# Patient Record
Sex: FEMALE | Race: WHITE | Employment: UNEMPLOYED | URBAN - METROPOLITAN AREA
[De-identification: names, ages, dates, MRNs, and addresses within clinical notes are randomized per-mention and may not be internally consistent; named-entity substitution may affect disease eponyms.]

---

## 2022-02-10 RX ORDER — SERTRALINE HYDROCHLORIDE 100 MG/1
100 TABLET, FILM COATED ORAL DAILY
COMMUNITY

## 2022-02-10 RX ORDER — ACETAMINOPHEN 160 MG
TABLET,DISINTEGRATING ORAL
COMMUNITY

## 2022-02-10 RX ORDER — PANTOPRAZOLE SODIUM 40 MG/1
40 TABLET, DELAYED RELEASE ORAL DAILY
COMMUNITY

## 2022-02-10 RX ORDER — LOSARTAN POTASSIUM 50 MG/1
50 TABLET ORAL DAILY
COMMUNITY

## 2022-02-10 RX ORDER — M-VIT,TX,IRON,MINS/CALC/FOLIC 27MG-0.4MG
1 TABLET ORAL DAILY
COMMUNITY

## 2022-02-10 NOTE — PROGRESS NOTES
Preoperative Instructions:    Stop eating solid foods at midnight the night prior to your surgery. Stop drinking clear liquids at midnight the night prior to your surgery. Arrive at the surgery center (3rd entrance) on ________2/24_______ by ______800am_________. Please stop any blood thinning medications as directed by your surgeon or prescribing physician. Failure to stop certain medications may interfere with your scheduled surgery. These may include: Aspirin, Coumadin, Plavix, NSAIDS (Motrin, Aleve, Advil, Mobic, Celebrex), Eliquis, Pradaxa, Xarelto, Fish oil, and herbal supplements. You may continue the rest of your medications through the night before surgery unless instructed otherwise. Day of surgery please take only the following medication(s) with a small sip of water:losartan      Please use and bring inhalers the day of surgery. Take a shower or bath on the morning of your surgery/procedure (hibiclens if directed)      Reminders:  -If you are going home the day of your procedure, you will need a family member or friend to stay during the procedure and drive you home after your procedure. Your  must be 25years of age or older and able to sign off on your discharge instructions.    -If you are going home the same day of your surgery, someone must remain with you for the first 24 hours after your surgery if you receive sedation or anesthesia.      -Please do not wear any jewelery or body piercing the day of surgery

## 2022-02-22 ENCOUNTER — ANESTHESIA EVENT (OUTPATIENT)
Dept: OPERATING ROOM | Age: 77
End: 2022-02-22
Payer: MEDICARE

## 2022-02-24 ENCOUNTER — ANESTHESIA (OUTPATIENT)
Dept: OPERATING ROOM | Age: 77
End: 2022-02-24
Payer: MEDICARE

## 2022-02-24 ENCOUNTER — HOSPITAL ENCOUNTER (OUTPATIENT)
Age: 77
Setting detail: OUTPATIENT SURGERY
Discharge: HOME | End: 2022-02-24
Attending: OBSTETRICS & GYNECOLOGY | Admitting: OBSTETRICS & GYNECOLOGY
Payer: MEDICARE

## 2022-02-24 ENCOUNTER — APPOINTMENT (OUTPATIENT)
Dept: GENERAL RADIOLOGY | Age: 77
End: 2022-02-24
Attending: OBSTETRICS & GYNECOLOGY
Payer: MEDICARE

## 2022-02-24 VITALS
DIASTOLIC BLOOD PRESSURE: 63 MMHG | BODY MASS INDEX: 26.5 KG/M2 | SYSTOLIC BLOOD PRESSURE: 144 MMHG | WEIGHT: 144 LBS | RESPIRATION RATE: 17 BRPM | HEART RATE: 75 BPM | OXYGEN SATURATION: 98 % | HEIGHT: 62 IN | TEMPERATURE: 97.7 F

## 2022-02-24 VITALS
RESPIRATION RATE: 14 BRPM | OXYGEN SATURATION: 100 % | SYSTOLIC BLOOD PRESSURE: 157 MMHG | DIASTOLIC BLOOD PRESSURE: 72 MMHG | TEMPERATURE: 94.3 F

## 2022-02-24 DIAGNOSIS — R32 URINARY INCONTINENCE, UNSPECIFIED TYPE: ICD-10-CM

## 2022-02-24 DIAGNOSIS — Z01.818 PRE-OP TESTING: Primary | ICD-10-CM

## 2022-02-24 PROCEDURE — C1787 PATIENT PROGR, NEUROSTIM: HCPCS | Performed by: OBSTETRICS & GYNECOLOGY

## 2022-02-24 PROCEDURE — 7100000011 HC PHASE II RECOVERY - ADDTL 15 MIN: Performed by: OBSTETRICS & GYNECOLOGY

## 2022-02-24 PROCEDURE — 2500000003 HC RX 250 WO HCPCS: Performed by: ANESTHESIOLOGY

## 2022-02-24 PROCEDURE — 3209999900 FLUORO FOR SURGICAL PROCEDURES

## 2022-02-24 PROCEDURE — 6360000002 HC RX W HCPCS: Performed by: ANESTHESIOLOGY

## 2022-02-24 PROCEDURE — 6360000002 HC RX W HCPCS: Performed by: OBSTETRICS & GYNECOLOGY

## 2022-02-24 PROCEDURE — C1778 LEAD, NEUROSTIMULATOR: HCPCS | Performed by: OBSTETRICS & GYNECOLOGY

## 2022-02-24 PROCEDURE — 2500000003 HC RX 250 WO HCPCS: Performed by: OBSTETRICS & GYNECOLOGY

## 2022-02-24 PROCEDURE — 7100000010 HC PHASE II RECOVERY - FIRST 15 MIN: Performed by: OBSTETRICS & GYNECOLOGY

## 2022-02-24 PROCEDURE — 3700000000 HC ANESTHESIA ATTENDED CARE: Performed by: OBSTETRICS & GYNECOLOGY

## 2022-02-24 PROCEDURE — 3600000002 HC SURGERY LEVEL 2 BASE: Performed by: OBSTETRICS & GYNECOLOGY

## 2022-02-24 PROCEDURE — 3600000012 HC SURGERY LEVEL 2 ADDTL 15MIN: Performed by: OBSTETRICS & GYNECOLOGY

## 2022-02-24 PROCEDURE — 2709999900 HC NON-CHARGEABLE SUPPLY: Performed by: OBSTETRICS & GYNECOLOGY

## 2022-02-24 PROCEDURE — 3700000001 HC ADD 15 MINUTES (ANESTHESIA): Performed by: OBSTETRICS & GYNECOLOGY

## 2022-02-24 PROCEDURE — 2580000003 HC RX 258

## 2022-02-24 DEVICE — LEAD IMPLANT KIT
Type: IMPLANTABLE DEVICE | Site: BACK | Status: FUNCTIONAL
Brand: AXONICS

## 2022-02-24 DEVICE — TINED LEAD KIT
Type: IMPLANTABLE DEVICE | Site: BACK | Status: FUNCTIONAL
Brand: AXONICS

## 2022-02-24 RX ORDER — DEXAMETHASONE SODIUM PHOSPHATE 10 MG/ML
INJECTION, SOLUTION INTRAMUSCULAR; INTRAVENOUS PRN
Status: DISCONTINUED | OUTPATIENT
Start: 2022-02-24 | End: 2022-02-24 | Stop reason: SDUPTHER

## 2022-02-24 RX ORDER — MEPERIDINE HYDROCHLORIDE 50 MG/ML
12.5 INJECTION INTRAMUSCULAR; INTRAVENOUS; SUBCUTANEOUS EVERY 5 MIN PRN
Status: DISCONTINUED | OUTPATIENT
Start: 2022-02-24 | End: 2022-02-24 | Stop reason: HOSPADM

## 2022-02-24 RX ORDER — MORPHINE SULFATE 1 MG/ML
1 INJECTION, SOLUTION EPIDURAL; INTRATHECAL; INTRAVENOUS EVERY 5 MIN PRN
Status: DISCONTINUED | OUTPATIENT
Start: 2022-02-24 | End: 2022-02-24 | Stop reason: HOSPADM

## 2022-02-24 RX ORDER — CEPHALEXIN 500 MG/1
500 CAPSULE ORAL 3 TIMES DAILY
Qty: 9 CAPSULE | Refills: 0 | Status: SHIPPED | OUTPATIENT
Start: 2022-02-24 | End: 2022-02-24 | Stop reason: SDUPTHER

## 2022-02-24 RX ORDER — SODIUM CHLORIDE 0.9 % (FLUSH) 0.9 %
10 SYRINGE (ML) INJECTION PRN
Status: DISCONTINUED | OUTPATIENT
Start: 2022-02-24 | End: 2022-02-24 | Stop reason: HOSPADM

## 2022-02-24 RX ORDER — SODIUM CHLORIDE 9 MG/ML
25 INJECTION, SOLUTION INTRAVENOUS PRN
Status: DISCONTINUED | OUTPATIENT
Start: 2022-02-24 | End: 2022-02-24 | Stop reason: HOSPADM

## 2022-02-24 RX ORDER — SODIUM CHLORIDE 0.9 % (FLUSH) 0.9 %
5-40 SYRINGE (ML) INJECTION PRN
Status: DISCONTINUED | OUTPATIENT
Start: 2022-02-24 | End: 2022-02-24 | Stop reason: HOSPADM

## 2022-02-24 RX ORDER — BUPIVACAINE HYDROCHLORIDE AND EPINEPHRINE 5; 5 MG/ML; UG/ML
INJECTION, SOLUTION PERINEURAL
Status: DISCONTINUED
Start: 2022-02-24 | End: 2022-02-24 | Stop reason: HOSPADM

## 2022-02-24 RX ORDER — SODIUM CHLORIDE 9 MG/ML
INJECTION, SOLUTION INTRAVENOUS CONTINUOUS
Status: DISCONTINUED | OUTPATIENT
Start: 2022-02-24 | End: 2022-02-24 | Stop reason: HOSPADM

## 2022-02-24 RX ORDER — PROPOFOL 10 MG/ML
INJECTION, EMULSION INTRAVENOUS PRN
Status: DISCONTINUED | OUTPATIENT
Start: 2022-02-24 | End: 2022-02-24 | Stop reason: SDUPTHER

## 2022-02-24 RX ORDER — ONDANSETRON 4 MG/1
4 TABLET, ORALLY DISINTEGRATING ORAL EVERY 8 HOURS PRN
Qty: 15 TABLET | Refills: 0 | Status: SHIPPED | OUTPATIENT
Start: 2022-02-24

## 2022-02-24 RX ORDER — SODIUM CHLORIDE, SODIUM LACTATE, POTASSIUM CHLORIDE, CALCIUM CHLORIDE 600; 310; 30; 20 MG/100ML; MG/100ML; MG/100ML; MG/100ML
INJECTION, SOLUTION INTRAVENOUS CONTINUOUS PRN
Status: DISCONTINUED | OUTPATIENT
Start: 2022-02-24 | End: 2022-02-24 | Stop reason: SDUPTHER

## 2022-02-24 RX ORDER — ONDANSETRON 2 MG/ML
4 INJECTION INTRAMUSCULAR; INTRAVENOUS
Status: DISCONTINUED | OUTPATIENT
Start: 2022-02-24 | End: 2022-02-24 | Stop reason: HOSPADM

## 2022-02-24 RX ORDER — SUCCINYLCHOLINE/SOD CL,ISO/PF 100 MG/5ML
SYRINGE (ML) INTRAVENOUS PRN
Status: DISCONTINUED | OUTPATIENT
Start: 2022-02-24 | End: 2022-02-24 | Stop reason: SDUPTHER

## 2022-02-24 RX ORDER — SODIUM CHLORIDE, SODIUM LACTATE, POTASSIUM CHLORIDE, CALCIUM CHLORIDE 600; 310; 30; 20 MG/100ML; MG/100ML; MG/100ML; MG/100ML
INJECTION, SOLUTION INTRAVENOUS CONTINUOUS
Status: DISCONTINUED | OUTPATIENT
Start: 2022-02-24 | End: 2022-02-24 | Stop reason: HOSPADM

## 2022-02-24 RX ORDER — SODIUM CHLORIDE 0.9 % (FLUSH) 0.9 %
10 SYRINGE (ML) INJECTION EVERY 12 HOURS SCHEDULED
Status: DISCONTINUED | OUTPATIENT
Start: 2022-02-24 | End: 2022-02-24 | Stop reason: HOSPADM

## 2022-02-24 RX ORDER — EPHEDRINE SULFATE/0.9% NACL/PF 50 MG/5 ML
SYRINGE (ML) INTRAVENOUS PRN
Status: DISCONTINUED | OUTPATIENT
Start: 2022-02-24 | End: 2022-02-24 | Stop reason: SDUPTHER

## 2022-02-24 RX ORDER — ONDANSETRON 2 MG/ML
INJECTION INTRAMUSCULAR; INTRAVENOUS PRN
Status: DISCONTINUED | OUTPATIENT
Start: 2022-02-24 | End: 2022-02-24 | Stop reason: SDUPTHER

## 2022-02-24 RX ORDER — LIDOCAINE HYDROCHLORIDE 10 MG/ML
INJECTION, SOLUTION EPIDURAL; INFILTRATION; INTRACAUDAL; PERINEURAL PRN
Status: DISCONTINUED | OUTPATIENT
Start: 2022-02-24 | End: 2022-02-24 | Stop reason: SDUPTHER

## 2022-02-24 RX ORDER — DIPHENHYDRAMINE HYDROCHLORIDE 50 MG/ML
12.5 INJECTION INTRAMUSCULAR; INTRAVENOUS
Status: DISCONTINUED | OUTPATIENT
Start: 2022-02-24 | End: 2022-02-24 | Stop reason: HOSPADM

## 2022-02-24 RX ORDER — SODIUM CHLORIDE 0.9 % (FLUSH) 0.9 %
5-40 SYRINGE (ML) INJECTION EVERY 12 HOURS SCHEDULED
Status: DISCONTINUED | OUTPATIENT
Start: 2022-02-24 | End: 2022-02-24 | Stop reason: HOSPADM

## 2022-02-24 RX ORDER — CEPHALEXIN 500 MG/1
500 CAPSULE ORAL 3 TIMES DAILY
Qty: 9 CAPSULE | Refills: 0 | Status: SHIPPED | OUTPATIENT
Start: 2022-02-24 | End: 2022-02-27

## 2022-02-24 RX ORDER — BUPIVACAINE HYDROCHLORIDE AND EPINEPHRINE 5; 5 MG/ML; UG/ML
INJECTION, SOLUTION PERINEURAL PRN
Status: DISCONTINUED | OUTPATIENT
Start: 2022-02-24 | End: 2022-02-24 | Stop reason: ALTCHOICE

## 2022-02-24 RX ORDER — FENTANYL CITRATE 50 UG/ML
INJECTION, SOLUTION INTRAMUSCULAR; INTRAVENOUS PRN
Status: DISCONTINUED | OUTPATIENT
Start: 2022-02-24 | End: 2022-02-24 | Stop reason: SDUPTHER

## 2022-02-24 RX ORDER — ACETAMINOPHEN 500 MG
1000 TABLET ORAL EVERY 6 HOURS PRN
Qty: 120 TABLET | Refills: 0 | Status: SHIPPED | OUTPATIENT
Start: 2022-02-24 | End: 2022-03-11

## 2022-02-24 RX ORDER — IBUPROFEN 400 MG/1
400 TABLET ORAL EVERY 6 HOURS PRN
Qty: 120 TABLET | Refills: 0 | Status: SHIPPED | OUTPATIENT
Start: 2022-02-24

## 2022-02-24 RX ADMIN — Medication 10 MG: at 10:32

## 2022-02-24 RX ADMIN — PROPOFOL 50 MG: 10 INJECTION, EMULSION INTRAVENOUS at 11:28

## 2022-02-24 RX ADMIN — LIDOCAINE HYDROCHLORIDE 50 MG: 10 INJECTION, SOLUTION EPIDURAL; INFILTRATION; INTRACAUDAL; PERINEURAL at 10:16

## 2022-02-24 RX ADMIN — ONDANSETRON 4 MG: 2 INJECTION INTRAMUSCULAR; INTRAVENOUS at 11:19

## 2022-02-24 RX ADMIN — PROPOFOL 120 MG: 10 INJECTION, EMULSION INTRAVENOUS at 10:16

## 2022-02-24 RX ADMIN — Medication 65 MG: at 10:16

## 2022-02-24 RX ADMIN — SODIUM CHLORIDE, POTASSIUM CHLORIDE, SODIUM LACTATE AND CALCIUM CHLORIDE: 600; 310; 30; 20 INJECTION, SOLUTION INTRAVENOUS at 10:05

## 2022-02-24 RX ADMIN — FENTANYL CITRATE 100 MCG: 50 INJECTION, SOLUTION INTRAMUSCULAR; INTRAVENOUS at 10:16

## 2022-02-24 RX ADMIN — DEXAMETHASONE SODIUM PHOSPHATE 5 MG: 10 INJECTION INTRAMUSCULAR; INTRAVENOUS at 10:34

## 2022-02-24 RX ADMIN — CEFAZOLIN SODIUM 2000 MG: 10 INJECTION, POWDER, FOR SOLUTION INTRAVENOUS at 10:31

## 2022-02-24 ASSESSMENT — PULMONARY FUNCTION TESTS
PIF_VALUE: 19
PIF_VALUE: 21
PIF_VALUE: 17
PIF_VALUE: 16
PIF_VALUE: 19
PIF_VALUE: 19
PIF_VALUE: 3
PIF_VALUE: 20
PIF_VALUE: 1
PIF_VALUE: 19
PIF_VALUE: 19
PIF_VALUE: 3
PIF_VALUE: 25
PIF_VALUE: 5
PIF_VALUE: 23
PIF_VALUE: 3
PIF_VALUE: 18
PIF_VALUE: 19
PIF_VALUE: 16
PIF_VALUE: 19
PIF_VALUE: 19
PIF_VALUE: 29
PIF_VALUE: 19
PIF_VALUE: 22
PIF_VALUE: 19
PIF_VALUE: 17
PIF_VALUE: 35
PIF_VALUE: 19
PIF_VALUE: 23
PIF_VALUE: 19
PIF_VALUE: 8
PIF_VALUE: 2
PIF_VALUE: 23
PIF_VALUE: 17
PIF_VALUE: 17
PIF_VALUE: 19
PIF_VALUE: 19
PIF_VALUE: 3
PIF_VALUE: 19
PIF_VALUE: 17
PIF_VALUE: 1
PIF_VALUE: 19
PIF_VALUE: 19
PIF_VALUE: 3
PIF_VALUE: 19
PIF_VALUE: 0
PIF_VALUE: 15
PIF_VALUE: 19
PIF_VALUE: 17
PIF_VALUE: 19
PIF_VALUE: 1
PIF_VALUE: 1
PIF_VALUE: 21
PIF_VALUE: 15
PIF_VALUE: 19
PIF_VALUE: 3
PIF_VALUE: 19
PIF_VALUE: 19
PIF_VALUE: 17
PIF_VALUE: 19
PIF_VALUE: 19
PIF_VALUE: 3
PIF_VALUE: 16
PIF_VALUE: 21
PIF_VALUE: 21
PIF_VALUE: 17
PIF_VALUE: 21
PIF_VALUE: 1
PIF_VALUE: 19
PIF_VALUE: 22
PIF_VALUE: 19
PIF_VALUE: 5
PIF_VALUE: 3
PIF_VALUE: 19
PIF_VALUE: 19
PIF_VALUE: 25
PIF_VALUE: 19

## 2022-02-24 ASSESSMENT — PAIN SCALES - GENERAL
PAINLEVEL_OUTOF10: 0

## 2022-02-24 NOTE — ANESTHESIA PRE PROCEDURE
Department of Anesthesiology  Preprocedure Note       Name:  Deandra Shine   Age:  64 y.o.  :  1945                                          MRN:  [id number]         Date:  2022      Surgeon: Frederic Talley):  Albaro Mejia DO    Procedure: Procedure(s):  AXONICS STAGE 1 STIMULATOR INSERTION    Medications prior to admission:   Prior to Admission medications    Medication Sig Start Date End Date Taking? Authorizing Provider   losartan (COZAAR) 50 MG tablet Take 50 mg by mouth daily   Yes Historical Provider, MD   Multiple Vitamins-Minerals (THERAPEUTIC MULTIVITAMIN-MINERALS) tablet Take 1 tablet by mouth daily   Yes Historical Provider, MD   Cholecalciferol (VITAMIN D3) 50 MCG ( UT) CAPS Take by mouth   Yes Historical Provider, MD   sertraline (ZOLOFT) 100 MG tablet Take 100 mg by mouth daily 2 po daily   Yes Historical Provider, MD   pantoprazole (PROTONIX) 40 MG tablet Take 40 mg by mouth daily   Yes Historical Provider, MD       Current medications:    Current Facility-Administered Medications   Medication Dose Route Frequency Provider Last Rate Last Admin    ceFAZolin (ANCEF) IVPB             0.9 % sodium chloride infusion   IntraVENous Continuous Diya Gregory MD        lactated ringers infusion   IntraVENous Continuous Diya Gregory MD        sodium chloride flush 0.9 % injection 10 mL  10 mL IntraVENous 2 times per day Diya Gregory MD        sodium chloride flush 0.9 % injection 10 mL  10 mL IntraVENous PRN Diya Gregory MD        0.9 % sodium chloride infusion  25 mL IntraVENous PRN Diya Gregory MD        ceFAZolin (ANCEF) 2000 mg in dextrose 5 % 50 mL IVPB  2,000 mg IntraVENous Once Albaro Mejia DO           Allergies: Allergies   Allergen Reactions    Vasotec [Enalapril] Other (See Comments)     cough       Problem List:  There is no problem list on file for this patient.       Past Medical History:        Diagnosis Date    Cancer ([address]     Non hodkins    GERD (gastroesophageal reflux disease)     Hypertension        Past Surgical History:        Procedure Laterality Date    APPENDECTOMY      BACK SURGERY      COLONOSCOPY      ENDOSCOPY, COLON, DIAGNOSTIC         Social History:    Social History     Tobacco Use    Smoking status: Never Smoker    Smokeless tobacco: Never Used   Substance Use Topics    Alcohol use: Yes     Comment: social                                Counseling given: Not Answered      Vital Signs (Current):   Vitals:    02/10/22 1537   Weight: 140 lb (63.5 kg)   Height: 5' 2\" (1.575 m)                                              BP Readings from Last 3 Encounters:   No data found for BP       NPO Status:                                                                                 BMI:   Wt Readings from Last 3 Encounters:   02/10/22 140 lb (63.5 kg)     Body mass index is 25.61 kg/m². CBC: No results found for: WBC, RBC, HGB, HCT, MCV, RDW, PLT    CMP: No results found for: NA, K, CL, CO2, BUN, CREATININE, GFRAA, AGRATIO, LABGLOM, GLUCOSE, GLU, PROT, CALCIUM, BILITOT, ALKPHOS, AST, ALT    POC Tests: No results for input(s): POCGLU, POCNA, POCK, POCCL, POCBUN, POCHEMO, POCHCT in the last 72 hours.     Coags: No results found for: PROTIME, INR, APTT    HCG (If Applicable): No results found for: PREGTESTUR, PREGSERUM, HCG, HCGQUANT     ABGs: No results found for: PHART, PO2ART, ZND1FWL, HUZ6NFU, BEART, T8LGIXNU     Type & Screen (If Applicable):  No results found for: LABABO, LABRH    Drug/Infectious Status (If Applicable):  No results found for: HIV, HEPCAB    COVID-19 Screening (If Applicable): No results found for: COVID19        Anesthesia Evaluation  Patient summary reviewed and Nursing notes reviewed no history of anesthetic complications:   Airway: Mallampati: I  TM distance: >3 FB   Neck ROM: full  Mouth opening: > = 3 FB Dental: normal exam         Pulmonary:Negative Pulmonary ROS and normal exam  breath sounds clear to auscultation Cardiovascular:    (+) hypertension: no interval change,         Rhythm: regular  Rate: normal                    Neuro/Psych:   Negative Neuro/Psych ROS              GI/Hepatic/Renal:   (+) GERD: no interval change,          ROS comment: URINARY AND FECAL INCONTINENCE. Endo/Other:    (+) malignancy/cancer. ROS comment: Non hodkins Abdominal:       Abdomen: soft. Vascular: negative vascular ROS. Other Findings:             Anesthesia Plan      general     ASA 3       Induction: intravenous. Anesthetic plan and risks discussed with patient. Plan discussed with CRNA.                   Nelson Castellon MD   2/24/2022

## 2022-02-24 NOTE — DISCHARGE SUMMARY
Gyn Discharge Summary  [facility]      Patient Name: Ivania Forbes  Patient : 1945  Primary Care Physician: Jonny Plascencia MD  Admit Date: 2022    Principal Diagnosis: urinary incontinance    Other Diagnosis:   URINARY AND FECAL INCONTINENCE  There is no problem list on file for this patient. Infection: No  Hospital Acquired: No    Surgical Operations & Procedures: Axonics stage 1 stimulator insertion    Consultations: Anesthesia    Pertinent Findings & Procedures:   Ivania Forbes is a 64 y.o. female , admitted for elective surgical management of urinary incontinence; received ancef preoperatively. She underwent axonics stage 1 stimulator insertion on 22. Post-op course normal, discharged home on 22. Follow up in 1-2 weeks. Discharge instructions reviewed and questions answered.     Course of patient: normal    Discharge to: Home    Readmission planned: No    Recommendations on Discharge:     Medications:     Medication List      START taking these medications    acetaminophen 500 MG tablet  Commonly known as: TYLENOL  Take 2 tablets by mouth every 6 hours as needed for Pain     cephALEXin 500 MG capsule  Commonly known as: KEFLEX  Take 1 capsule by mouth 3 times daily for 3 days     ibuprofen 400 MG tablet  Commonly known as: ADVIL;MOTRIN  Take 1 tablet by mouth every 6 hours as needed for Pain     ondansetron 4 MG disintegrating tablet  Commonly known as: ZOFRAN-ODT  Take 1 tablet by mouth every 8 hours as needed for Nausea or Vomiting        CONTINUE taking these medications    Cozaar 50 MG tablet  Generic drug: losartan     pantoprazole 40 MG tablet  Commonly known as: PROTONIX     sertraline 100 MG tablet  Commonly known as: ZOLOFT     therapeutic multivitamin-minerals tablet     Vitamin D3 50 MCG ( UT) Caps           Where to Get Your Medications      You can get these medications from any pharmacy    Bring a paper prescription for each of these medications  · acetaminophen 500 MG tablet  · cephALEXin 500 MG capsule  · ibuprofen 400 MG tablet  · ondansetron 4 MG disintegrating tablet           Activity: no driving on narcotics, no lifting greater than 10 lbs  Diet: regular diet  Follow up: 1-2 weeks     Condition on discharge: good and stable   Discharge Date: 2/24/22    Comments:  Home care, Follow-up care, restrictions reviewed.     Marichuy Negro DO  Ob/Gyn Resident  [facility]  2/24/2022, 11:57 AM

## 2022-02-24 NOTE — ANESTHESIA POSTPROCEDURE EVALUATION
POST- ANESTHESIA EVALUATION       Pt Name: Junie Quinones  MRN: [id number]  YOB: 1945  Date of evaluation: 2/24/2022  Time:  1:49 PM      /62   Pulse 75   Temp 97.7 °F (36.5 °C) (Temporal)   Resp 16   Ht 5' 2\" (1.575 m)   Wt 144 lb (65.3 kg)   SpO2 95%   BMI 26.34 kg/m²      Consciousness Level  Awake  Cardiopulmonary Status  Stable  Pain Adequately Treated YES  Nausea / Vomiting  NO  Adequate Hydration  YES  Anesthesia Related Complications NONE      Electronically signed by John Johnson MD on 2/24/2022 at 1:49 PM       Department of Anesthesiology  Postprocedure Note    Patient: Junie Quinones  MRN: [id number]  YOB: 1945  Date of evaluation: 2/24/2022  Time:  1:49 PM     Procedure Summary     Date: 02/24/22 Room / Location: [address]    Anesthesia Start: 1006 Anesthesia Stop: 8256    Procedure: AXONICS STAGE 1 STIMULATOR INSERTION (N/A ) Diagnosis: (URINARY AND FECAL INCONTINENCE)    Surgeons: Cammy Conn DO Responsible Provider: John Johnson MD    Anesthesia Type: general ASA Status: 3          Anesthesia Type: general    Alex Phase I: Alex Score: 10    Alex Phase II: Alex Score: 9    Last vitals: Reviewed and per EMR flowsheets.        Anesthesia Post Evaluation

## 2022-02-24 NOTE — H&P
OB/GYN Pre-Op H&P  [facility]    Patient Name: Kushal Bernal     Patient : 1945  Room/Bed: Room/bed info not found  Admission Date/Time: No admission date for patient encounter. Primary Care Physician: Diomedes Haywood MD  MRN: [id number]    Date: 2022  Time: 7:21 AM    The patient was seen in pre-op holding. She is here for elective surgical management for urinary incontinence. The procedure risks and complications were reviewed. The labs, Consent, and H&P were reviewed and updated. The patient was counseled on the possibility of  the need of a second surgery. The patient voiced understanding and had all of her questions answered. The possibility of incomplete removal of abnormal tissue was discussed. Implant site marked bilaterally in preop. OBSTETRICAL HISTORY:   OB History   No obstetric history on file. PAST MEDICAL HISTORY:   has a past medical history of Cancer ([facility] [address] GERD (gastroesophageal reflux disease), and Hypertension. PAST SURGICAL HISTORY:   has a past surgical history that includes Appendectomy; back surgery; Colonoscopy; and Endoscopy, colon, diagnostic. ALLERGIES:  Allergies as of 2022    (Not on File)       MEDICATIONS:  No current facility-administered medications for this encounter. Current Outpatient Medications   Medication Sig Dispense Refill    losartan (COZAAR) 50 MG tablet Take 50 mg by mouth daily      Multiple Vitamins-Minerals (THERAPEUTIC MULTIVITAMIN-MINERALS) tablet Take 1 tablet by mouth daily      Cholecalciferol (VITAMIN D3) 50 MCG (2000 UT) CAPS Take by mouth      sertraline (ZOLOFT) 100 MG tablet Take 100 mg by mouth daily 2 po daily      pantoprazole (PROTONIX) 40 MG tablet Take 40 mg by mouth daily         FAMILY HISTORY:  family history is not on file. SOCIAL HISTORY:   reports that she has never smoked. She has never used smokeless tobacco. She reports current alcohol use.  She reports previous drug use.    VITALS:  Vitals:    02/10/22 1537 02/24/22 0907   BP:  (!) 150/77   Pulse:  69   Resp:  18   Temp:  97.8 °F (36.6 °C)   TempSrc:  Temporal   SpO2:  100%   Weight: 140 lb (63.5 kg) 144 lb (65.3 kg)   Height: 5' 2\" (1.575 m) 5' 2\" (1.575 m)                                                                                                                               PHYSICAL EXAM:     Unchanged from Prior H&P  CONSTITUTIONAL:  Alert and oriented, no acute distress  HEAD: normocephalic, atraumatic  EYES: Pupils equal and reactive to light, Extraocular muscles intact, sclera non icteric  ENT: Mucus membranes moist, No otorrhea, no rhinorrhea  NECK:  supple, symmetrical, trachea midline   LUNGS:  Good air movement bilaterally, unlabored respirations, no wheezes or rhonchi  CARDIOVASCULAR: Regular rate and rhythm, no murmurs rubs or gallops  ABDOMEN: soft, non tender, non distended, no rebound or guarding, no hernias, no hepatomegaly, no splenomegly  MUSCULOSKELETAL:  Equal strength bilaterally, normal muscle tone  SKIN: No abscess or rash  NEUROLOGIC:  Cranial nerves 2-12 grossly intact, no focal deficits  PSYCH: affect appropriate  Pelvic Exam: deferred to OR      LAB RESULTS:  No visits with results within 4 Week(s) from this visit. Latest known visit with results is:   No results found for any previous visit. DIAGNOSTICS:    No results found. DIAGNOSIS & PLAN:  1. Urinary Incontinence   - Proceed with planned procedure: axonics stage 1 stimulator insertion   - Consent signed, on chart. - The patient is ready for transport to the operative suite. Counseling: The patient was counseled on all options both medical and surgical, conservative as well as definitive. She has elected to proceed with the procedure as stated above. The patient was counseled on the procedure. Risks and complications were reviewed in detail. The patients orders, labs, consents have been completed.  The history and physical as well as all supporting surgical documentation will be forwarded to the pre-operative holding area. The patient is aware that this procedure may not alleviate her symptoms. That there may be a necessity for a second surgery and that there may be an incomplete removal of abnormal tissue.     Johnny Brewer DO  Ob/Gyn Resident   [facility]  2/24/2022, 7:21 AM

## 2022-02-25 NOTE — OP NOTE
[facility]                                OPERATIVE REPORT    PATIENT NAME: Mulu Grissom                     :        1945  MED REC NO:   [id number]                             ROOM:  ACCOUNT NO:   [de-identified]                           ADMIT DATE: 2022  PROVIDER:     Traci Lizarraga    DATE OF PROCEDURE:  2022    INDICATIONS FOR SURGERY:  Refractory urge urinary incontinence. PREOPERATIVE DIAGNOSIS:  Refractory urge urinary incontinence, chronic  low sacral pain since adolescence, idiopathic. POSTOPERATIVE DIAGNOSIS:  Refractory urge urinary incontinence, chronic  low sacral pain since adolescence, idiopathic. PROCEDURE:  Axonics stage I temporary neurostimulator lead placement. SURGEON:  Traci Lizarraga DO.    ASSISTANT:  Brandie Butler DO; Raya Cline DO    ANESTHESIA:  General endotracheal.    FINDINGS:  As above. WOUND CLASS:  I, clean case. CPT code 67096, 92528 with a wire placed  on the right. SPECIMENS:  None. COMPLICATIONS:  None. IMPLANTS:  Temporary Axonics lead wire. BLOOD LOSS:  None. COURSE:  Prior to the procedure, risks and benefits of the procedure  explained to the patient. The patient understood and signed informed  consent under no duress or confusion. She received a preoperative  antibiotic and EPC cuffs were functional.  She was taken back to the OR  and prepped and draped in sterile fashion in the prone position under  general anesthesia. It is important to note that preoperatively, the  patient was marked appropriately for the eventual neurostimulator  placement. A ground pad was placed on the bottom of the patient's foot  and connected to the clinician  along with the test  stimulation J hook cable. Tape was used on the buttocks to expose the  anus. Preoperative fluoroscopy was completed to visualize sacral  anatomy. Attention was turned to the placement of lead wire to the  level of S3 and the medial border of the sacral foramen wire identified  bilaterally using fluoroscopy in the AP and lateral views. After  administration of local anesthesia, foramen needle was placed in the  superior medial aspect of the S3 foramen such that the needle was  parallel to the medial border of the foramen. Lateral fluoroscopic  image was obtained to ensure the needle entry point was approximately 1  cm above and then parallel to the fusion plate of S3. The needle was  advanced such as the tip of the foramen needle was just at the anterior  aspect of the sacrum. The J hook was then placed on the uninsulated  portion of the foramen needle and stimulation applied to obtain opening  threshold amplitudes of 1.6-2.5 with a diego response and minimal toe  flexion. The patient was extremely osteoarthritic in the area with  extreme sacral convexity in the area. Several attempts at needle  placement were required to obtain an ideal response. Once this response  was confirmed, an incision was made at the foramen needle to accommodate  the lead wire and tunneling tool. Directional guide was placed through  the foramen needle and the needle removed. The introducer was placed  over the directional guide and advanced under live fluoroscopy such that  the radiopaque marker was placed snf through the sacral plate. The  dilator was removed along with directional guide. Using live floor  fluoroscopy, the tined lead with a curved stylet was placed through the  introducer until the electrode 3 straddled the anterior service of the  sacrum ensuring that the lead had a downward trajectory. It took  multiple attempts to get this trajectory but it was obtained. The  stimulation clip was then placed on the distal lead and each electrode  was tested observing a motor response.   After satisfactory lead  positioning was confirmed, the introducer was retracted over the lead  under continuous fluoroscopy, but before tines were placed, we did find  that the best stimulation occurred more superficially with gentle  withdrawal of this introducer to where actually it was the lead II and  lead III that had the optimal responses, all at approximately 1.5 to 2.5  initially and a very small diego response and toe flexion to the first  lead. The 0 lead could never be elicited. It was felt because of  osteoarthritis that any attempt on the left hand side might be futile. A tunneling tool with an overlying plastic sheath was inserted from the  lead subcutaneously to the location of the INS' pocket on the right side  which had been previously marked and anesthetized. A 2.5 cm incision  was made lateral to the site of the INS placement site to a depth of no  greater than 2 cm to the skin. A small pocket was created and using the  tunneling tool was passed from the lead wire insertion to the lateral  pocket, the tip was removed and the lead was fed through the sheath  exiting the pocket site. The sheath was then removed. The lead was  cleaned, dried, and connected to the [employer]. Cutaneous extension with  a single set screw which was tightened using the torque wrench. The  percutaneous extension was tunneled through the contralateral side and  exited through a skin puncture site, had been previously anesthetized. Percutaneous extension was then connected to the external pulse  generator. Incisions were irrigated with antibiotic solution and the  sterile water and the INS incision was closed with 2-0 Vicryl suture on  the subcutaneous layer. Skin was closed with running 3-0 and 4-0  Monocryl skin sutures. Adhesive strips and gauze were placed over the  incision and covered with transparent dressings. Instrument, sponge and  needle counts were correct x2. The patient tolerated the procedure  well.   The patient's family was updated by personal consultation  regarding the outcome of the surgery. The patient will go through a  trial over the next 2 weeks and if she has a good response, the  permanent stimulator will be placed. She will go home on a 3-day course  of an antibiotic, and follow up in the office. Kimberlyn Martinez    D: 02/24/2022 11:45:27       T: 02/24/2022 11:51:06     [email or web address]  Job#: [id number]     Doc#: [id number]    CC:   [id number] [address]

## 2022-02-25 NOTE — PROGRESS NOTES
CLINICAL PHARMACY NOTE: MEDS TO BEDS    Total # of Prescriptions Filled: 4   The following medications were delivered to the patient:  · Cephalexin 500mg  · Ondansetron ODT 4mg  · Ibuprofen 400mg  · Tylenol ES 500mg    Additional Documentation:

## 2022-03-03 NOTE — DISCHARGE SUMMARY
Gyn Discharge Summary  [facility]      Patient Name: Rayne Vidal  Patient : 1945  Primary Care Physician: Jade Bhatt MD  Admit Date: 3/9/2022    Principal Diagnosis: Urinary and Fecal Incontinence    Other Diagnosis:   URINARY AND FECAL INCONTINENCE  There is no problem list on file for this patient. Infection: No  Hospital Acquired: No    Surgical Operations & Procedures: Axonics Stage 2 Stimulator Insertion    Consultations: Anesthesia    Pertinent Findings & Procedures:   Rayne Vidal is a 64 y.o. female No obstetric history on file. , admitted for surgical management of urinary and fecal incontinence; received Ancef preoperatively. She underwent axonics stage 2 stimulator insertion on 3/9/22. Post-op course normal, discharged home on 3/9/22. Follow up in 1-2 weeks. Discharge instructions reviewed and questions answered.     Course of patient: normal    Discharge to: Home    Readmission planned: No    Recommendations on Discharge:     Medications:     Medication List      START taking these medications    cephALEXin 500 MG capsule  Commonly known as: KEFLEX  Take 1 capsule by mouth 3 times daily for 3 days        CONTINUE taking these medications    acetaminophen 500 MG tablet  Commonly known as: TYLENOL  Take 2 tablets by mouth every 6 hours as needed for Pain     Cozaar 50 MG tablet  Generic drug: losartan     ibuprofen 400 MG tablet  Commonly known as: ADVIL;MOTRIN  Take 1 tablet by mouth every 6 hours as needed for Pain     ondansetron 4 MG disintegrating tablet  Commonly known as: ZOFRAN-ODT  Take 1 tablet by mouth every 8 hours as needed for Nausea or Vomiting     pantoprazole 40 MG tablet  Commonly known as: PROTONIX     sertraline 100 MG tablet  Commonly known as: ZOLOFT     therapeutic multivitamin-minerals tablet     Vitamin D3 50 MCG ( UT) Caps           Where to Get Your Medications      You can get these medications from any pharmacy    Bring a paper prescription for each of these medications  · cephALEXin 500 MG capsule           Activity: no lifting greater than 10 lbs  Diet: regular diet  Follow up: 1-2 weeks     Condition on discharge: good and stable   Discharge Date: 3/9/22    Comments:  Home care, Follow-up care, restrictions reviewed.     Laurence Damian DO  Ob/Gyn Resident  [facility]  3/9/2022, 9:18 AM

## 2022-03-03 NOTE — H&P
OB/GYN Pre-Op H&P  [facility]    Patient Name: Felicity Crabtree     Patient : 1945  Room/Bed: [facility] OR [place] [facility]/NONE  Admission Date/Time: 3/9/2022  6:46 AM  Primary Care Physician: Brant Campbell MD  MRN: [id number]    Date: 3/9/2022  Time: 7:24 AM    The patient was seen in pre-op holding. She is here for axonics stage 2 stimulator insertion for treatment of urinary incontinence. The procedure risks and complications were reviewed. The labs, Consent, and H&P were reviewed and updated. The patient was counseled on the possibility of  the need of a second surgery. The patient voiced understanding and had all of her questions answered. The possibility of incomplete removal of abnormal tissue was discussed. OBSTETRICAL HISTORY:   OB History   No obstetric history on file. PAST MEDICAL HISTORY:   has a past medical history of Cancer ([address] GERD (gastroesophageal reflux disease), and Hypertension. PAST SURGICAL HISTORY:   has a past surgical history that includes Appendectomy; back surgery; Colonoscopy; Endoscopy, colon, diagnostic; Bladder surgery (2022); and Stimulator Surgery (N/A, 2022). ALLERGIES:  Allergies as of 2022 - Fully Reviewed 02/10/2022   Allergen Reaction Noted    Vasotec [enalapril] Other (See Comments) 02/10/2022       MEDICATIONS:  No current facility-administered medications for this encounter. FAMILY HISTORY:  family history is not on file. SOCIAL HISTORY:   reports that she has never smoked. She has never used smokeless tobacco. She reports current alcohol use. She reports previous drug use.     VITALS:  Vitals:    22 0717   BP: (!) 170/74   Pulse: 63   Resp: 16   Temp: 98.2 °F (36.8 °C)   TempSrc: Skin   SpO2: 98%   Weight: 142 lb 12.8 oz (64.8 kg)   Height: 5' 2\" (1.575 m)                                                                                                                               PHYSICAL EXAM: Unchanged from Prior H&P  CONSTITUTIONAL:  Alert and oriented, no acute distress  HEAD: normocephalic, atraumatic  EYES: Pupils equal and reactive to light, Extraocular muscles intact, sclera non icteric  ENT: Mucus membranes moist, No otorrhea, no rhinorrhea  NECK:  supple, symmetrical, trachea midline   LUNGS:  Good air movement bilaterally, unlabored respirations, no wheezes or rhonchi  CARDIOVASCULAR: Regular rate and rhythm, no murmurs rubs or gallops  ABDOMEN: soft, non tender, non distended, no rebound or guarding, no hernias, no hepatomegaly, no splenomegly  MUSCULOSKELETAL:  Equal strength bilaterally, normal muscle tone  SKIN: No abscess or rash  NEUROLOGIC:  Cranial nerves 2-12 grossly intact, no focal deficits  PSYCH: affect appropriate  Pelvic Exam: deferred to OR      LAB RESULTS:  No visits with results within 4 Week(s) from this visit. Latest known visit with results is:   No results found for any previous visit. DIAGNOSTICS:    FLUORO FOR SURGICAL PROCEDURES    Result Date: 2/24/2022  Radiology exam is complete. No Radiologist dictation. Please follow up with ordering provider. DIAGNOSIS & PLAN:  1. Urinary Incontinence   - Proceed with planned procedure: Axonics Stage 2 Stimulator insertion   - Consent signed, on chart. - The patient is ready for transport to the operative suite. Counseling: The patient was counseled on all options both medical and surgical, conservative as well as definitive. She has elected to proceed with the procedure as stated above. The patient was counseled on the procedure. Risks and complications were reviewed in detail. The patients orders, labs, consents have been completed. The history and physical as well as all supporting surgical documentation will be forwarded to the pre-operative holding area. The patient is aware that this procedure may not alleviate her symptoms.  That there may be a necessity for a second surgery and that there may be an incomplete removal of abnormal tissue.     Keaton Drake DO  Ob/Gyn Resident   [facility]  3/9/2022, 7:24 AM

## 2022-03-07 ENCOUNTER — ANESTHESIA EVENT (OUTPATIENT)
Dept: OPERATING ROOM | Age: 77
End: 2022-03-07
Payer: MEDICARE

## 2022-03-07 RX ORDER — SODIUM CHLORIDE, SODIUM LACTATE, POTASSIUM CHLORIDE, CALCIUM CHLORIDE 600; 310; 30; 20 MG/100ML; MG/100ML; MG/100ML; MG/100ML
INJECTION, SOLUTION INTRAVENOUS CONTINUOUS
Status: CANCELLED | OUTPATIENT
Start: 2022-03-07

## 2022-03-07 RX ORDER — SODIUM CHLORIDE 9 MG/ML
25 INJECTION, SOLUTION INTRAVENOUS PRN
Status: CANCELLED | OUTPATIENT
Start: 2022-03-07

## 2022-03-07 RX ORDER — SODIUM CHLORIDE 0.9 % (FLUSH) 0.9 %
10 SYRINGE (ML) INJECTION PRN
Status: CANCELLED | OUTPATIENT
Start: 2022-03-07

## 2022-03-07 RX ORDER — SODIUM CHLORIDE 9 MG/ML
INJECTION, SOLUTION INTRAVENOUS CONTINUOUS
Status: CANCELLED | OUTPATIENT
Start: 2022-03-07

## 2022-03-07 RX ORDER — SODIUM CHLORIDE 0.9 % (FLUSH) 0.9 %
10 SYRINGE (ML) INJECTION EVERY 12 HOURS SCHEDULED
Status: CANCELLED | OUTPATIENT
Start: 2022-03-07

## 2022-03-09 ENCOUNTER — ANESTHESIA (OUTPATIENT)
Dept: OPERATING ROOM | Age: 77
End: 2022-03-09
Payer: MEDICARE

## 2022-03-09 ENCOUNTER — HOSPITAL ENCOUNTER (OUTPATIENT)
Age: 77
Setting detail: OUTPATIENT SURGERY
Discharge: HOME | End: 2022-03-09
Attending: OBSTETRICS & GYNECOLOGY | Admitting: OBSTETRICS & GYNECOLOGY
Payer: MEDICARE

## 2022-03-09 VITALS
OXYGEN SATURATION: 97 % | SYSTOLIC BLOOD PRESSURE: 164 MMHG | RESPIRATION RATE: 19 BRPM | HEART RATE: 59 BPM | DIASTOLIC BLOOD PRESSURE: 71 MMHG | WEIGHT: 142.8 LBS | TEMPERATURE: 97.6 F | BODY MASS INDEX: 26.28 KG/M2 | HEIGHT: 62 IN

## 2022-03-09 VITALS — SYSTOLIC BLOOD PRESSURE: 136 MMHG | OXYGEN SATURATION: 100 % | DIASTOLIC BLOOD PRESSURE: 64 MMHG

## 2022-03-09 PROCEDURE — 3700000000 HC ANESTHESIA ATTENDED CARE: Performed by: OBSTETRICS & GYNECOLOGY

## 2022-03-09 PROCEDURE — 2500000003 HC RX 250 WO HCPCS: Performed by: OBSTETRICS & GYNECOLOGY

## 2022-03-09 PROCEDURE — 7100000011 HC PHASE II RECOVERY - ADDTL 15 MIN: Performed by: OBSTETRICS & GYNECOLOGY

## 2022-03-09 PROCEDURE — L8689 EXTERNAL RECHARG SYS INTERN: HCPCS | Performed by: OBSTETRICS & GYNECOLOGY

## 2022-03-09 PROCEDURE — C1820 GENERATOR NEURO RECHG BAT SY: HCPCS | Performed by: OBSTETRICS & GYNECOLOGY

## 2022-03-09 PROCEDURE — 2580000003 HC RX 258: Performed by: NURSE ANESTHETIST, CERTIFIED REGISTERED

## 2022-03-09 PROCEDURE — 3600000012 HC SURGERY LEVEL 2 ADDTL 15MIN: Performed by: OBSTETRICS & GYNECOLOGY

## 2022-03-09 PROCEDURE — 6360000002 HC RX W HCPCS: Performed by: NURSE ANESTHETIST, CERTIFIED REGISTERED

## 2022-03-09 PROCEDURE — 3700000001 HC ADD 15 MINUTES (ANESTHESIA): Performed by: OBSTETRICS & GYNECOLOGY

## 2022-03-09 PROCEDURE — 3600000002 HC SURGERY LEVEL 2 BASE: Performed by: OBSTETRICS & GYNECOLOGY

## 2022-03-09 PROCEDURE — 2709999900 HC NON-CHARGEABLE SUPPLY: Performed by: OBSTETRICS & GYNECOLOGY

## 2022-03-09 PROCEDURE — 7100000010 HC PHASE II RECOVERY - FIRST 15 MIN: Performed by: OBSTETRICS & GYNECOLOGY

## 2022-03-09 PROCEDURE — 2500000003 HC RX 250 WO HCPCS: Performed by: NURSE ANESTHETIST, CERTIFIED REGISTERED

## 2022-03-09 DEVICE — NEUROSTIMULATOR
Type: IMPLANTABLE DEVICE | Site: BACK | Status: FUNCTIONAL
Brand: AXONICS

## 2022-03-09 RX ORDER — MORPHINE SULFATE 1 MG/ML
1 INJECTION, SOLUTION EPIDURAL; INTRATHECAL; INTRAVENOUS EVERY 5 MIN PRN
Status: DISCONTINUED | OUTPATIENT
Start: 2022-03-09 | End: 2022-03-09 | Stop reason: HOSPADM

## 2022-03-09 RX ORDER — FENTANYL CITRATE 50 UG/ML
INJECTION, SOLUTION INTRAMUSCULAR; INTRAVENOUS PRN
Status: DISCONTINUED | OUTPATIENT
Start: 2022-03-09 | End: 2022-03-09 | Stop reason: SDUPTHER

## 2022-03-09 RX ORDER — CEPHALEXIN 500 MG/1
500 CAPSULE ORAL 3 TIMES DAILY
Qty: 9 CAPSULE | Refills: 0 | Status: SHIPPED | OUTPATIENT
Start: 2022-03-09 | End: 2022-03-12

## 2022-03-09 RX ORDER — ONDANSETRON 2 MG/ML
4 INJECTION INTRAMUSCULAR; INTRAVENOUS
Status: DISCONTINUED | OUTPATIENT
Start: 2022-03-09 | End: 2022-03-09 | Stop reason: HOSPADM

## 2022-03-09 RX ORDER — DIPHENHYDRAMINE HYDROCHLORIDE 50 MG/ML
12.5 INJECTION INTRAMUSCULAR; INTRAVENOUS
Status: DISCONTINUED | OUTPATIENT
Start: 2022-03-09 | End: 2022-03-09 | Stop reason: HOSPADM

## 2022-03-09 RX ORDER — LIDOCAINE HYDROCHLORIDE 10 MG/ML
INJECTION, SOLUTION EPIDURAL; INFILTRATION; INTRACAUDAL; PERINEURAL PRN
Status: DISCONTINUED | OUTPATIENT
Start: 2022-03-09 | End: 2022-03-09 | Stop reason: SDUPTHER

## 2022-03-09 RX ORDER — PROPOFOL 10 MG/ML
INJECTION, EMULSION INTRAVENOUS CONTINUOUS PRN
Status: DISCONTINUED | OUTPATIENT
Start: 2022-03-09 | End: 2022-03-09 | Stop reason: SDUPTHER

## 2022-03-09 RX ORDER — SODIUM CHLORIDE 9 MG/ML
25 INJECTION, SOLUTION INTRAVENOUS PRN
Status: DISCONTINUED | OUTPATIENT
Start: 2022-03-09 | End: 2022-03-09 | Stop reason: HOSPADM

## 2022-03-09 RX ORDER — BUPIVACAINE HYDROCHLORIDE AND EPINEPHRINE 5; 5 MG/ML; UG/ML
INJECTION, SOLUTION PERINEURAL PRN
Status: DISCONTINUED | OUTPATIENT
Start: 2022-03-09 | End: 2022-03-09 | Stop reason: ALTCHOICE

## 2022-03-09 RX ORDER — SODIUM CHLORIDE, SODIUM LACTATE, POTASSIUM CHLORIDE, CALCIUM CHLORIDE 600; 310; 30; 20 MG/100ML; MG/100ML; MG/100ML; MG/100ML
INJECTION, SOLUTION INTRAVENOUS CONTINUOUS PRN
Status: DISCONTINUED | OUTPATIENT
Start: 2022-03-09 | End: 2022-03-09 | Stop reason: SDUPTHER

## 2022-03-09 RX ORDER — SODIUM CHLORIDE 0.9 % (FLUSH) 0.9 %
5-40 SYRINGE (ML) INJECTION EVERY 12 HOURS SCHEDULED
Status: DISCONTINUED | OUTPATIENT
Start: 2022-03-09 | End: 2022-03-09 | Stop reason: HOSPADM

## 2022-03-09 RX ORDER — MEPERIDINE HYDROCHLORIDE 50 MG/ML
12.5 INJECTION INTRAMUSCULAR; INTRAVENOUS; SUBCUTANEOUS EVERY 5 MIN PRN
Status: DISCONTINUED | OUTPATIENT
Start: 2022-03-09 | End: 2022-03-09 | Stop reason: HOSPADM

## 2022-03-09 RX ORDER — SODIUM CHLORIDE 0.9 % (FLUSH) 0.9 %
5-40 SYRINGE (ML) INJECTION PRN
Status: DISCONTINUED | OUTPATIENT
Start: 2022-03-09 | End: 2022-03-09 | Stop reason: HOSPADM

## 2022-03-09 RX ADMIN — LIDOCAINE HYDROCHLORIDE 50 MG: 10 INJECTION, SOLUTION EPIDURAL; INFILTRATION; INTRACAUDAL; PERINEURAL at 08:41

## 2022-03-09 RX ADMIN — FENTANYL CITRATE 25 MCG: 50 INJECTION, SOLUTION INTRAMUSCULAR; INTRAVENOUS at 08:44

## 2022-03-09 RX ADMIN — SODIUM CHLORIDE, POTASSIUM CHLORIDE, SODIUM LACTATE AND CALCIUM CHLORIDE: 600; 310; 30; 20 INJECTION, SOLUTION INTRAVENOUS at 08:32

## 2022-03-09 RX ADMIN — PROPOFOL 125 MCG/KG/MIN: 10 INJECTION, EMULSION INTRAVENOUS at 08:43

## 2022-03-09 RX ADMIN — FENTANYL CITRATE 25 MCG: 50 INJECTION, SOLUTION INTRAMUSCULAR; INTRAVENOUS at 09:01

## 2022-03-09 RX ADMIN — CEFAZOLIN 2000 MG: 10 INJECTION, POWDER, FOR SOLUTION INTRAVENOUS at 08:48

## 2022-03-09 RX ADMIN — FENTANYL CITRATE 25 MCG: 50 INJECTION, SOLUTION INTRAMUSCULAR; INTRAVENOUS at 08:52

## 2022-03-09 ASSESSMENT — PULMONARY FUNCTION TESTS
PIF_VALUE: 0

## 2022-03-09 ASSESSMENT — PAIN SCALES - GENERAL: PAINLEVEL_OUTOF10: 0

## 2022-03-09 ASSESSMENT — PAIN - FUNCTIONAL ASSESSMENT: PAIN_FUNCTIONAL_ASSESSMENT: 0-10

## 2022-03-09 NOTE — ANESTHESIA POSTPROCEDURE EVALUATION
POST- ANESTHESIA EVALUATION       Pt Name: Eddie Vidales  MRN: [id number]  YOB: 1945  Date of evaluation: 3/9/2022  Time:  9:27 AM      BP (!) 157/91   Pulse 68   Temp 97.6 °F (36.4 °C) (Temporal)   Resp 15   Ht 5' 2\" (1.575 m)   Wt 142 lb 12.8 oz (64.8 kg)   SpO2 98%   BMI 26.12 kg/m²      Consciousness Level  Awake  Cardiopulmonary Status  Stable  Pain Adequately Treated YES  Nausea / Vomiting  NO  Adequate Hydration  YES  Anesthesia Related Complications NONE      Electronically signed by Geraldo Randolph MD on 3/9/2022 at 9:27 AM       Department of Anesthesiology  Postprocedure Note    Patient: Eddie Vidales  MRN: [id number]  YOB: 1945  Date of evaluation: 3/9/2022  Time:  9:27 AM     Procedure Summary     Date: 03/09/22 Room / Location: [address] / [address] [place] [address]    Anesthesia Start: 4047 Anesthesia Stop: 6963    Procedure: AXONICS STAGE 2 SACRAL NEUROMODULATION INSERTION (N/A Back) Diagnosis: (URINARY AND FECAL INCONTINENCE)    Surgeons: Rylie Malik DO Responsible Provider: Geraldo Randolph MD    Anesthesia Type: MAC, general ASA Status: 3          Anesthesia Type: MAC, general    Alex Phase I: Alex Score: 10    Alex Phase II: Alex Score: 10    Last vitals: Reviewed and per EMR flowsheets.        Anesthesia Post Evaluation

## 2022-03-09 NOTE — OP NOTE
[facility]                                OPERATIVE REPORT    PATIENT NAME: Mulu Grissom                     :        1945  MED REC NO:   [id number]                             ROOM:  ACCOUNT NO:   [de-identified]                           ADMIT DATE: 2022  PROVIDER:     Traci Lizarraga    DATE OF PROCEDURE:  2022    INDICATION FOR PROCEDURE:  Refractory urge urinary incontinence. There  was a malfunction of the lead wire; thus the patient did want to proceed  based on positive impedances at stage I postoperative testing. CPT code  36469. WOUND CLASS:  I.    PREOPERATIVE DIAGNOSIS:  Refractory urge urinary incontinence. POSTOPERATIVE DIAGNOSIS:  Refractory urge urinary incontinence. PROCEDURE:  Placement of the Axonics stage II permanent INS  neurostimulator. SURGEON:  Traci Lizarraga DO    ANESTHESIA:  MAC. FINDINGS:  As above. SPECIMENS:  None. COMPLICATIONS:  None. IMPLANT:  Axonics stage II neurostimulator. OPERATIVE COURSE:  After surgical consent was reviewed with the patient  including risks, benefits and alternatives, the patient expressed desire  to proceed with surgery. The patient was taken to the operating room and placed in the prone  position. Pillows placed on the lower abdomen and hips to level and  flatten the sacrum and allow the toes to dangle freely. IV antibiotic  was infused 30 minutes prior to incision. MAC anesthesia was induced. Attention was turned to placement of the neurostimulator, the prior  incisions were identified and anesthetized. A 2-1/2 cm incision was  made over the prior incision and the connection wire to the percutaneous  extension was identified and exteriorized. Torque wrench was used to  disconnect the percutaneous extension from the lead wire.   Percutaneous  extension connector was cuff off and the lead wire was removed by  pulling from the exit point. The INS pocket was created using  combination of sharp and blunt dissection maintaining a depth 2 cm. Lead wire was connected to the neurostimulator and secured with torque  wrench. Neurostimulator was placed into the pocket with ceramic window  placed laterally and lead connection placed superiorly. Any excessive  lead was coiled and placed behind the neurostimulator. Impedances were  confirmed to be within normal limits. Incisions were irrigated with  antibiotic solution and sterile water and the INS incision was closed  with delayed absorbable suture and skin adhesive. Sponge, needle counts  and instruments counts were correct x2. The patient tolerated the  procedure well. She could sense stimulation and did show positive anal  diego before closure of the incision to ensure that we still have  proper placement and responses. The patient's family was updated by  personal consultation as well. She will follow up in 1 week. Pancho Castillo    D: 03/09/2022 9:23:37       T: 03/09/2022 9:27:24     [email or web address]  Job#: [id number]     Doc#: [id number]    CC:   [id number] [address]

## 2022-03-09 NOTE — ANESTHESIA PRE PROCEDURE
Department of Anesthesiology  Preprocedure Note       Name:  Brionna Payan   Age:  64 y.o.  :  1945                                          MRN:  [id number]         Date:  3/9/2022      Surgeon: Ok Floor):  Darliss Mcburney, DO    Procedure: Procedure(s):  AXONICS STAGE 2 STIMULATOR INSERTION    Medications prior to admission:   Prior to Admission medications    Medication Sig Start Date End Date Taking? Authorizing Provider   cephALEXin (KEFLEX) 500 MG capsule Take 1 capsule by mouth 3 times daily for 3 days 3/9/22 3/12/22 Yes Archana Del Rio,    ibuprofen (ADVIL;MOTRIN) 400 MG tablet Take 1 tablet by mouth every 6 hours as needed for Pain 22  Yes Janis Brantley DO   acetaminophen (TYLENOL) 500 MG tablet Take 2 tablets by mouth every 6 hours as needed for Pain 2/24/22 3/11/22 Yes Anneliese Ward DO   losartan (COZAAR) 50 MG tablet Take 50 mg by mouth daily   Yes Historical Provider, MD   Multiple Vitamins-Minerals (THERAPEUTIC MULTIVITAMIN-MINERALS) tablet Take 1 tablet by mouth daily   Yes Historical Provider, MD   Cholecalciferol (VITAMIN D3) 50 MCG (2000 UT) CAPS Take by mouth   Yes Historical Provider, MD   sertraline (ZOLOFT) 100 MG tablet Take 100 mg by mouth daily 2 po daily   Yes Historical Provider, MD   pantoprazole (PROTONIX) 40 MG tablet Take 40 mg by mouth daily   Yes Historical Provider, MD   ondansetron (ZOFRAN-ODT) 4 MG disintegrating tablet Take 1 tablet by mouth every 8 hours as needed for Nausea or Vomiting 22   Janis Brantley DO       Current medications:    Current Facility-Administered Medications   Medication Dose Route Frequency Provider Last Rate Last Admin    ceFAZolin (ANCEF) IVPB                Allergies: Allergies   Allergen Reactions    Vasotec [Enalapril] Other (See Comments)     cough       Problem List:  There is no problem list on file for this patient.       Past Medical History:        Diagnosis Date    Cancer [facility])     Non hodkins    GERD (gastroesophageal reflux disease)     Hypertension        Past Surgical History:        Procedure Laterality Date    APPENDECTOMY      BACK SURGERY      BLADDER SURGERY  02/24/2022    AXONICS STAGE 1 STIMULATOR INSERTION (N/A )    COLONOSCOPY      ENDOSCOPY, COLON, DIAGNOSTIC      STIMULATOR SURGERY N/A 2/24/2022    AXONICS STAGE 1 STIMULATOR INSERTION performed by Andrew Longo DO at [address] [place] [address] History:    Social History     Tobacco Use    Smoking status: Never Smoker    Smokeless tobacco: Never Used   Substance Use Topics    Alcohol use: Yes     Comment: social                                Counseling given: Not Answered      Vital Signs (Current):   Vitals:    03/09/22 0717   BP: (!) 170/74   Pulse: 63   Resp: 16   Temp: 98.2 °F (36.8 °C)   TempSrc: Skin   SpO2: 98%   Weight: 142 lb 12.8 oz (64.8 kg)   Height: 5' 2\" (1.575 m)                                              BP Readings from Last 3 Encounters:   03/09/22 (!) 170/74   02/24/22 (!) 157/72   02/24/22 (!) 144/63       NPO Status: Time of last liquid consumption: 2200                        Time of last solid consumption: 1900                        Date of last liquid consumption: 03/08/22                        Date of last solid food consumption: 03/08/22    BMI:   Wt Readings from Last 3 Encounters:   03/09/22 142 lb 12.8 oz (64.8 kg)   02/24/22 144 lb (65.3 kg)     Body mass index is 26.12 kg/m². CBC: No results found for: WBC, RBC, HGB, HCT, MCV, RDW, PLT    CMP: No results found for: NA, K, CL, CO2, BUN, CREATININE, GFRAA, AGRATIO, LABGLOM, GLUCOSE, GLU, PROT, CALCIUM, BILITOT, ALKPHOS, AST, ALT    POC Tests: No results for input(s): POCGLU, POCNA, POCK, POCCL, POCBUN, POCHEMO, POCHCT in the last 72 hours.     Coags: No results found for: PROTIME, INR, APTT    HCG (If Applicable): No results found for: PREGTESTUR, PREGSERUM, HCG, HCGQUANT     ABGs: No results found for: PHART, PO2ART, CLH6XCC, VMK1FDQ, BEART, B7VJPAFI     Type & Screen (If Applicable):  No results found for: LABABO, LABRH    Drug/Infectious Status (If Applicable):  No results found for: HIV, HEPCAB    COVID-19 Screening (If Applicable): No results found for: COVID19        Anesthesia Evaluation  Patient summary reviewed and Nursing notes reviewed no history of anesthetic complications:   Airway: Mallampati: I  TM distance: >3 FB   Neck ROM: full  Mouth opening: > = 3 FB Dental: normal exam         Pulmonary:Negative Pulmonary ROS and normal exam  breath sounds clear to auscultation                             Cardiovascular:    (+) hypertension: no interval change,         Rhythm: regular  Rate: normal                    Neuro/Psych:   Negative Neuro/Psych ROS              GI/Hepatic/Renal:   (+) GERD:,          ROS comment: URINARY AND FECAL INCONTINENCE. Endo/Other:    (+) malignancy/cancer. ROS comment: Non hodkins Abdominal:       Abdomen: soft. Vascular: negative vascular ROS. Other Findings:             Anesthesia Plan      MAC and general     ASA 3       Induction: intravenous. Anesthetic plan and risks discussed with patient. Plan discussed with CRNA.                   Shannan Bassett MD   3/9/2022

## (undated) DEVICE — DRESSING TRNSPAR W5XL4.5IN FLM SHT SEMIPERMEABLE WIND

## (undated) DEVICE — STRAP,POSITIONING,KNEE/BODY,FOAM,4X60": Brand: MEDLINE

## (undated) DEVICE — SPONGE DRN W4XL4IN RAYON/POLYESTER 6 PLY NONWOVEN PRECUT

## (undated) DEVICE — CLEANSER SKIN 32OZ 4% CHG ANTIMIC ANTISEP SGL WRP HIBICLN

## (undated) DEVICE — ELECTRODE PT RET AD L9FT HI MOIST COND ADH HYDRGEL CORDED

## (undated) DEVICE — SOLUTION IV IRRIG WATER 1000ML POUR BRL 2F7114

## (undated) DEVICE — GLOVE ORANGE PI 7 1/2   MSG9075

## (undated) DEVICE — SUTURE VCRL + SZ 3-0 L27IN ABSRB UD L26MM SH 1/2 CIR VCP416H

## (undated) DEVICE — BLANKET WRM W29.9XL79.1IN UP BODY FORC AIR MISTRAL-AIR

## (undated) DEVICE — MHPB GEN MINOR PACK: Brand: MEDLINE INDUSTRIES, INC.

## (undated) DEVICE — SUTURE MCRYL + SZ 4-0 L27IN ABSRB UD L19MM PS-2 3/8 CIR MCP426H

## (undated) DEVICE — SNAP KOVER: Brand: UNBRANDED

## (undated) DEVICE — GOWN,SIRUS,NONRNF,SETINSLV,XL,20/CS: Brand: MEDLINE

## (undated) DEVICE — 3M™ IOBAN™ 2 ANTIMICROBIAL INCISE DRAPE 6650EZ: Brand: IOBAN™ 2

## (undated) DEVICE — ADHESIVE SKIN CLSR 0.7ML TOP DERMBND ADV

## (undated) DEVICE — Z DISCONTINUED BY MEDLINE USE 2711682 TRAY SKIN PREP DRY W/ PREM GLV

## (undated) DEVICE — APPLICATOR MEDICATED 26 CC SOLUTION HI LT ORNG CHLORAPREP

## (undated) DEVICE — PERCUTANEOUS EXTENSION: Brand: AXONICS

## (undated) DEVICE — CHARGING SYSTEM: Brand: AXONICS

## (undated) DEVICE — C-ARMOR C-ARM EQUIPMENT COVERS CLEAR STERILE UNIVERSAL FIT 12 PER CASE: Brand: C-ARMOR

## (undated) DEVICE — TRIAL STIMULATOR: Brand: AXONICS

## (undated) DEVICE — PROTECTOR ULN NRV PUR FOAM HK LOOP STRP ANATOMICALLY

## (undated) DEVICE — SUTURE PERMAHAND SZ 3-0 L30IN NONABSORBABLE BLK SH L26MM K832H

## (undated) DEVICE — PENCIL ES L3M BTTN SWCH HOLSTER W/ BLDE ELECTRD EDGE

## (undated) DEVICE — SINGLE PORT MANIFOLD: Brand: NEPTUNE 2

## (undated) DEVICE — REMOTE CONTROL: Brand: AXONICS

## (undated) DEVICE — SUTURE MCRYL + SZ 4 0 L18IN ABSRB UD PC 3 L16MM 3 8 CIR PRIM MCP845G